# Patient Record
Sex: MALE | Race: WHITE | NOT HISPANIC OR LATINO | Employment: FULL TIME | URBAN - METROPOLITAN AREA
[De-identification: names, ages, dates, MRNs, and addresses within clinical notes are randomized per-mention and may not be internally consistent; named-entity substitution may affect disease eponyms.]

---

## 2023-11-16 ENCOUNTER — HOSPITAL ENCOUNTER (EMERGENCY)
Facility: MEDICAL CENTER | Age: 43
End: 2023-11-16
Attending: EMERGENCY MEDICINE
Payer: COMMERCIAL

## 2023-11-16 VITALS
DIASTOLIC BLOOD PRESSURE: 78 MMHG | HEART RATE: 77 BPM | SYSTOLIC BLOOD PRESSURE: 130 MMHG | TEMPERATURE: 97.8 F | BODY MASS INDEX: 22.07 KG/M2 | WEIGHT: 137.35 LBS | HEIGHT: 66 IN | RESPIRATION RATE: 16 BRPM | OXYGEN SATURATION: 99 %

## 2023-11-16 DIAGNOSIS — Z76.0 MEDICATION REFILL: ICD-10-CM

## 2023-11-16 PROCEDURE — 99282 EMERGENCY DEPT VISIT SF MDM: CPT

## 2023-11-16 RX ORDER — MYCOPHENOLIC ACID 180 MG/1
180 TABLET, DELAYED RELEASE ORAL
Qty: 240 TABLET | Refills: 2 | Status: SHIPPED | OUTPATIENT
Start: 2023-11-16 | End: 2023-12-02 | Stop reason: SDUPTHER

## 2023-11-16 RX ORDER — TACROLIMUS 1 MG/1
1 CAPSULE ORAL 4 TIMES DAILY
Qty: 60 CAPSULE | Refills: 2 | Status: SHIPPED | OUTPATIENT
Start: 2023-11-16 | End: 2023-11-16 | Stop reason: SDUPTHER

## 2023-11-16 RX ORDER — MYCOPHENOLIC ACID 180 MG/1
180 TABLET, DELAYED RELEASE ORAL
Qty: 240 TABLET | Refills: 2 | Status: SHIPPED | OUTPATIENT
Start: 2023-11-16 | End: 2023-11-16 | Stop reason: SDUPTHER

## 2023-11-16 RX ORDER — LEVOTHYROXINE SODIUM 0.12 MG/1
125 TABLET ORAL
Qty: 30 TABLET | Refills: 2 | Status: SHIPPED | OUTPATIENT
Start: 2023-11-16

## 2023-11-16 RX ORDER — TACROLIMUS 1 MG/1
1 CAPSULE ORAL 4 TIMES DAILY
Qty: 60 CAPSULE | Refills: 2 | Status: SHIPPED | OUTPATIENT
Start: 2023-11-16

## 2023-11-16 RX ORDER — OMEPRAZOLE 20 MG/1
20 CAPSULE, DELAYED RELEASE ORAL DAILY
Qty: 30 CAPSULE | Refills: 2 | Status: SHIPPED | OUTPATIENT
Start: 2023-11-16

## 2023-11-16 RX ORDER — LEVOTHYROXINE SODIUM 0.12 MG/1
125 TABLET ORAL
Status: SHIPPED | COMMUNITY
End: 2023-11-16 | Stop reason: SDUPTHER

## 2023-11-16 RX ORDER — OMEPRAZOLE 20 MG/1
20 CAPSULE, DELAYED RELEASE ORAL DAILY
Status: SHIPPED | COMMUNITY
End: 2023-11-16 | Stop reason: SDUPTHER

## 2023-11-16 RX ORDER — TACROLIMUS 1 MG/1
1 CAPSULE ORAL 4 TIMES DAILY
Status: SHIPPED | COMMUNITY
End: 2023-11-16 | Stop reason: SDUPTHER

## 2023-11-16 RX ORDER — OMEPRAZOLE 20 MG/1
20 CAPSULE, DELAYED RELEASE ORAL DAILY
Qty: 30 CAPSULE | Refills: 2 | Status: SHIPPED | OUTPATIENT
Start: 2023-11-16 | End: 2023-11-16 | Stop reason: SDUPTHER

## 2023-11-16 RX ORDER — MYCOPHENOLIC ACID 180 MG/1
180 TABLET, DELAYED RELEASE ORAL
Status: SHIPPED | COMMUNITY
End: 2023-11-16 | Stop reason: SDUPTHER

## 2023-11-16 RX ORDER — LEVOTHYROXINE SODIUM 0.12 MG/1
125 TABLET ORAL
Qty: 30 TABLET | Refills: 2 | Status: SHIPPED | OUTPATIENT
Start: 2023-11-16 | End: 2023-11-16 | Stop reason: SDUPTHER

## 2023-11-16 NOTE — ED TRIAGE NOTES
Chief Complaint   Patient presents with    Medication Refill     Patient ambulatory to triage with steady gait.   Patient has a history of a kidney transplant in 2015. Just moved to [place] and has not been set up with a nephrologist yet.   Patient has been taking his medication but will run out tomorrow. Requesting refills of   Myfortic: 180 mg 2 times daily.   Levothyroxine: 125 mch daily  Tacrolimus: 1 mg 4 times daily.   Omeprazole 20 mg daily.     Patient denies any medical complaints at this time.

## 2023-11-16 NOTE — ED PROVIDER NOTES
"ED Provider Note    CHIEF COMPLAINT  Chief Complaint   Patient presents with    Medication Refill       EXTERNAL RECORDS REVIEWED  Other no records in this EMR    HPI/ROS  LIMITATION TO HISTORY   Select: : None  OUTSIDE HISTORIAN(S):  None    Khari Alarcon is a 43 y.o. male who presents to the emergency department for medication refill.  He explains that he has moved from the [facility] to [place].  Prior kidney transplant and diabetic.  He has been able to arrange PCP or specialty care since relocating.  Now needing medication refill as he will be running out of his medications to include antirejection medications.  Otherwise has been feeling well.  No new complaints emergently otherwise.    PAST MEDICAL HISTORY   has a past medical history of Kidney transplant recipient (2015).    SURGICAL HISTORY  patient denies any surgical history    FAMILY HISTORY  History reviewed. No pertinent family history.    SOCIAL HISTORY  Social History     Tobacco Use    Smoking status: Never    Smokeless tobacco: Never   Vaping Use    Vaping Use: Never used   Substance and Sexual Activity    Alcohol use: Never    Drug use: Never    Sexual activity: Not on file       CURRENT MEDICATIONS  Home Medications       Reviewed by Svitlana Buckley R.N. (Registered Nurse) on 11/16/23 at 1356  Med List Status: Complete     Medication Last Dose Status   levothyroxine (SYNTHROID) 125 MCG Tab 11/16/2023 Active   mycophenolate (MYFORTIC) 180 MG EC tablet 11/16/2023 Active   omeprazole (PRILOSEC) 20 MG delayed-release capsule 11/16/2023 Active   tacrolimus (PROGRAF) 1 MG Cap 11/16/2023 Active                    ALLERGIES  No Known Allergies    PHYSICAL EXAM  VITAL SIGNS: /83   Pulse 75   Temp 36.3 °C (97.3 °F) (Temporal)   Resp 18   Ht 1.676 m (5' 6\")   Wt 62.3 kg (137 lb 5.6 oz)   SpO2 99%   BMI 22.17 kg/m²      Pulse ox interpretation: I interpret this pulse ox as normal.  Constitutional: Alert in no apparent distress.  HENT: No signs of " trauma, Bilateral external ears normal, Nose normal.   Eyes: Pupils are equal and reactive  Neck: Normal range of motion  Thorax & Lungs:  No respiratory distress  Skin: Warm, Dry, No erythema, No rash.   Musculoskeletal: Good range of motion in all major joints. No tenderness to palpation or major deformities noted.   Neurologic: Alert , Normal motor function, Normal sensory function, No focal deficits noted.   Psychiatric: Affect normal, Judgment normal, Mood normal.       COURSE & MEDICAL DECISION MAKING    ED Observation Status? No; Patient does not meet criteria for ED Observation.     INITIAL ASSESSMENT, COURSE AND PLAN  Care Narrative: Patient presenting for medication refill which will be completed here today.  DISPOSITION AND DISCUSSIONS  I have discussed management of the patient with the following physicians and MANUEL's: None    Discussion of management with other QHP or appropriate source(s): None     Escalation of care considered, and ultimately not performed:blood analysis, diagnostic imaging, and acute inpatient care management, however at this time, the patient is most appropriate for outpatient management    Barriers to care at this time, including but not limited to: Patient does not have established PCP.     43-year-old male presenting the [facility] for medication refill.  His 4 medications will be refilled as requested.  He has been referred to local PCP as well as nephrology for outpatient care and follow-up.  He is understanding return precautions here to the ER.  Refills have been provided for 3 months time.        FINAL DIAGNOSIS  1. Medication refill           Electronically signed by: Diego Byers M.D., 11/16/2023 2:34 PM

## 2023-11-16 NOTE — ED NOTES
.Patient discharged in stable condition per orders. Patient verbalized understanding of all discharge instructions. All belongings accounted for.

## 2023-12-02 RX ORDER — MYCOPHENOLIC ACID 180 MG/1
360 TABLET, DELAYED RELEASE ORAL
Qty: 240 TABLET | Refills: 2 | Status: SHIPPED | OUTPATIENT
Start: 2023-12-02 | End: 2023-12-18

## 2023-12-18 RX ORDER — MYCOPHENOLIC ACID 180 MG/1
720 TABLET, DELAYED RELEASE ORAL 2 TIMES DAILY
Qty: 240 TABLET | Refills: 1 | Status: SHIPPED | OUTPATIENT
Start: 2023-12-18 | End: 2024-02-16

## 2023-12-19 NOTE — DISCHARGE PLANNING
Pt called stating a CM was working on getting his medication changed. Epic reviewed and do not see a note.    Pt states he has a kidney transplant and has been on myfortic 180 mg 4 tabs twice a day for nine years. When he came to ER for med refill MD wrote for 2 tabs twice a day. He recently moved here from [place] and his insurance does not start until January so he is unable to get appt with PCP or specialist until then.    CM discussed with MD who rewrote prescription. Pt updated.

## 2024-01-23 NOTE — DISCHARGE PLANNING
ER  Note:  Received call from pt asking for Tacrolimus refill. Per pt, he was at [facility] ER in November 2023. Per pt, he was supposed to get insurance on January 1, 2024 but lost his job a few days prior so he was not able to schedule his outpatient appointment saying he does not have insurance. Encouraged pt to schedule his appointment. Provided contact information for [facility] and [facility].   RN CM spoke with ERP. Per ERP, unable to prescribe refill without seeing the pt in the ER. RN CM called back pt, update provided, informed of ERP response. Per pt, he contacted his transplant doctor but was told he needs to see them in the office and they can prescribe pt refills. Pt aware of options, such as contacting his transplant team, contacting [facility] or [facility] for outpatient appointment, going to the ER, as needed. Pt also reported he received a bill for his previous ER visit, when it should have been covered by his insurance. Informed pt he can call the billing office to discuss any billing issues. Pt verbalized understanding and denies other needs at this time.